# Patient Record
Sex: FEMALE | Race: BLACK OR AFRICAN AMERICAN | NOT HISPANIC OR LATINO | URBAN - METROPOLITAN AREA
[De-identification: names, ages, dates, MRNs, and addresses within clinical notes are randomized per-mention and may not be internally consistent; named-entity substitution may affect disease eponyms.]

---

## 2018-05-31 ENCOUNTER — NEW PATIENT (OUTPATIENT)
Dept: URBAN - METROPOLITAN AREA CLINIC 56 | Facility: CLINIC | Age: 63
End: 2018-05-31
Payer: OTHER GOVERNMENT

## 2018-05-31 DIAGNOSIS — H25.13 AGE-RELATED NUCLEAR CATARACT, BILATERAL: ICD-10-CM

## 2018-05-31 DIAGNOSIS — H16.223 KERATOCONJUNCTIVITIS SICCA, BILATERAL: ICD-10-CM

## 2018-05-31 DIAGNOSIS — Z79.84 LONG TERM (CURRENT) USE OF ORAL ANTIDIABETIC DRUGS: ICD-10-CM

## 2018-05-31 PROCEDURE — 99203 OFFICE O/P NEW LOW 30 MIN: CPT | Performed by: OPHTHALMOLOGY

## 2018-05-31 PROCEDURE — 92250 FUNDUS PHOTOGRAPHY W/I&R: CPT | Performed by: OPHTHALMOLOGY

## 2018-05-31 ASSESSMENT — INTRAOCULAR PRESSURE
OD: 18
OS: 18

## 2018-05-31 ASSESSMENT — VISUAL ACUITY
OS: 20/40
OD: 20/50

## 2018-05-31 ASSESSMENT — KERATOMETRY
OS: 42.99
OD: 42.91

## 2018-06-29 ENCOUNTER — Encounter (OUTPATIENT)
Dept: URBAN - METROPOLITAN AREA CLINIC 56 | Facility: CLINIC | Age: 63
End: 2018-06-29
Payer: OTHER GOVERNMENT

## 2018-06-29 DIAGNOSIS — Z01.818 ENCOUNTER FOR OTHER PREPROCEDURAL EXAMINATION: Primary | ICD-10-CM

## 2018-06-29 PROCEDURE — 92025 CPTRIZED CORNEAL TOPOGRAPHY: CPT | Performed by: OPHTHALMOLOGY

## 2018-06-29 PROCEDURE — 99213 OFFICE O/P EST LOW 20 MIN: CPT | Performed by: PHYSICIAN ASSISTANT

## 2018-07-11 ENCOUNTER — FOLLOW UP ESTABLISHED (OUTPATIENT)
Dept: URBAN - METROPOLITAN AREA CLINIC 44 | Facility: CLINIC | Age: 63
End: 2018-07-11
Payer: OTHER GOVERNMENT

## 2018-07-11 DIAGNOSIS — H25.11 AGE-RELATED NUCLEAR CATARACT, RIGHT EYE: Primary | ICD-10-CM

## 2018-07-11 PROCEDURE — 99213 OFFICE O/P EST LOW 20 MIN: CPT | Performed by: OPHTHALMOLOGY

## 2018-07-11 RX ORDER — PREDNISOLONE ACETATE 10 MG/ML
1 % SUSPENSION/ DROPS OPHTHALMIC
Qty: 1 | Refills: 2 | Status: INACTIVE
Start: 2018-07-11 | End: 2019-02-19

## 2018-07-11 RX ORDER — DICLOFENAC SODIUM 1 MG/ML
0.1 % SOLUTION/ DROPS OPHTHALMIC
Qty: 1 | Refills: 2 | Status: INACTIVE
Start: 2018-07-11 | End: 2019-02-19

## 2018-07-17 ENCOUNTER — SURGERY (OUTPATIENT)
Dept: URBAN - METROPOLITAN AREA SURGERY 19 | Facility: SURGERY | Age: 63
End: 2018-07-17
Payer: OTHER GOVERNMENT

## 2018-07-17 PROCEDURE — 66984 XCAPSL CTRC RMVL W/O ECP: CPT | Performed by: OPHTHALMOLOGY

## 2018-07-18 ENCOUNTER — POST OP (OUTPATIENT)
Dept: URBAN - METROPOLITAN AREA CLINIC 56 | Facility: CLINIC | Age: 63
End: 2018-07-18

## 2018-07-18 PROCEDURE — 99024 POSTOP FOLLOW-UP VISIT: CPT | Performed by: OPTOMETRIST

## 2018-07-18 ASSESSMENT — INTRAOCULAR PRESSURE: OD: 26

## 2018-07-20 ENCOUNTER — Encounter (OUTPATIENT)
Dept: URBAN - METROPOLITAN AREA CLINIC 56 | Facility: CLINIC | Age: 63
End: 2018-07-20
Payer: OTHER GOVERNMENT

## 2018-07-20 PROCEDURE — 99213 OFFICE O/P EST LOW 20 MIN: CPT | Performed by: PHYSICIAN ASSISTANT

## 2018-07-25 ENCOUNTER — POST OP (OUTPATIENT)
Dept: URBAN - METROPOLITAN AREA CLINIC 56 | Facility: CLINIC | Age: 63
End: 2018-07-25

## 2018-07-25 DIAGNOSIS — Z96.1 PRESENCE OF INTRAOCULAR LENS: Primary | ICD-10-CM

## 2018-07-25 PROCEDURE — 99024 POSTOP FOLLOW-UP VISIT: CPT | Performed by: OPTOMETRIST

## 2018-07-25 ASSESSMENT — INTRAOCULAR PRESSURE
OD: 25
OS: 23

## 2018-07-25 ASSESSMENT — VISUAL ACUITY
OD: 20/300
OS: 20/150

## 2018-07-26 ENCOUNTER — Encounter (OUTPATIENT)
Dept: URBAN - METROPOLITAN AREA CLINIC 51 | Facility: CLINIC | Age: 63
End: 2018-07-26
Payer: OTHER GOVERNMENT

## 2018-07-26 DIAGNOSIS — H35.371 PUCKERING OF MACULA, RIGHT EYE: Primary | ICD-10-CM

## 2018-07-26 PROCEDURE — 92014 COMPRE OPH EXAM EST PT 1/>: CPT | Performed by: OPHTHALMOLOGY

## 2018-07-26 PROCEDURE — 92134 CPTRZ OPH DX IMG PST SGM RTA: CPT | Performed by: OPHTHALMOLOGY

## 2018-07-26 ASSESSMENT — INTRAOCULAR PRESSURE
OD: 19
OS: 20

## 2018-08-01 ENCOUNTER — POST OP (OUTPATIENT)
Dept: URBAN - METROPOLITAN AREA CLINIC 44 | Facility: CLINIC | Age: 63
End: 2018-08-01

## 2018-08-01 PROCEDURE — 99024 POSTOP FOLLOW-UP VISIT: CPT | Performed by: OPHTHALMOLOGY

## 2018-08-01 ASSESSMENT — INTRAOCULAR PRESSURE
OS: 14
OD: 18

## 2018-08-07 ENCOUNTER — POST OP (OUTPATIENT)
Dept: URBAN - METROPOLITAN AREA CLINIC 44 | Facility: CLINIC | Age: 63
End: 2018-08-07

## 2018-08-07 PROCEDURE — 99024 POSTOP FOLLOW-UP VISIT: CPT | Performed by: OPHTHALMOLOGY

## 2018-08-07 ASSESSMENT — INTRAOCULAR PRESSURE
OD: 18
OS: 18

## 2019-02-19 ENCOUNTER — FOLLOW UP ESTABLISHED (OUTPATIENT)
Dept: URBAN - METROPOLITAN AREA CLINIC 44 | Facility: CLINIC | Age: 64
End: 2019-02-19
Payer: OTHER GOVERNMENT

## 2019-02-19 DIAGNOSIS — R73.03 PREDIABETES: ICD-10-CM

## 2019-02-19 PROCEDURE — 92250 FUNDUS PHOTOGRAPHY W/I&R: CPT | Performed by: OPHTHALMOLOGY

## 2019-02-19 PROCEDURE — 99213 OFFICE O/P EST LOW 20 MIN: CPT | Performed by: OPHTHALMOLOGY

## 2019-02-19 PROCEDURE — 92015 DETERMINE REFRACTIVE STATE: CPT | Performed by: OPHTHALMOLOGY

## 2019-02-19 PROCEDURE — 92083 EXTENDED VISUAL FIELD XM: CPT | Performed by: OPHTHALMOLOGY

## 2019-02-19 RX ORDER — METFORMIN HYDROCHLORIDE 1000 MG/1
TABLET, FILM COATED ORAL
Qty: 0 | Refills: 0 | Status: INACTIVE
Start: 2019-02-19 | End: 2019-06-27

## 2019-02-19 ASSESSMENT — VISUAL ACUITY
OS: 20/25
OD: 20/40

## 2019-02-19 ASSESSMENT — KERATOMETRY
OS: 43.13
OD: 43.25

## 2019-02-19 ASSESSMENT — INTRAOCULAR PRESSURE
OD: 14
OS: 15

## 2019-06-18 ENCOUNTER — FOLLOW UP ESTABLISHED (OUTPATIENT)
Dept: URBAN - METROPOLITAN AREA CLINIC 51 | Facility: CLINIC | Age: 64
End: 2019-06-18
Payer: OTHER GOVERNMENT

## 2019-06-18 DIAGNOSIS — E11.9 TYPE 2 DIABETES MELLITUS WITHOUT COMPLICATIONS: ICD-10-CM

## 2019-06-18 PROCEDURE — 92082 INTERMEDIATE VISUAL FIELD XM: CPT | Performed by: OPTOMETRIST

## 2019-06-18 PROCEDURE — 92014 COMPRE OPH EXAM EST PT 1/>: CPT | Performed by: OPTOMETRIST

## 2019-06-18 PROCEDURE — 92133 CPTRZD OPH DX IMG PST SGM ON: CPT | Performed by: OPTOMETRIST

## 2019-06-18 PROCEDURE — 92004 COMPRE OPH EXAM NEW PT 1/>: CPT | Performed by: OPTOMETRIST

## 2019-06-18 PROCEDURE — 92134 CPTRZ OPH DX IMG PST SGM RTA: CPT | Performed by: OPTOMETRIST

## 2019-06-18 ASSESSMENT — KERATOMETRY
OS: 43.24
OD: 43.08

## 2019-06-18 ASSESSMENT — VISUAL ACUITY
OD: 20/400
OS: 20/20

## 2019-06-18 ASSESSMENT — INTRAOCULAR PRESSURE
OS: 12
OD: 13

## 2019-06-27 ENCOUNTER — FOLLOW UP ESTABLISHED (OUTPATIENT)
Dept: URBAN - METROPOLITAN AREA CLINIC 51 | Facility: CLINIC | Age: 64
End: 2019-06-27
Payer: OTHER GOVERNMENT

## 2019-06-27 PROCEDURE — 92083 EXTENDED VISUAL FIELD XM: CPT | Performed by: OPTOMETRIST

## 2019-06-27 PROCEDURE — 92012 INTRM OPH EXAM EST PATIENT: CPT | Performed by: OPTOMETRIST

## 2020-02-11 ENCOUNTER — FOLLOW UP ESTABLISHED (OUTPATIENT)
Dept: URBAN - METROPOLITAN AREA CLINIC 51 | Facility: CLINIC | Age: 65
End: 2020-02-11
Payer: OTHER GOVERNMENT

## 2020-02-11 DIAGNOSIS — H25.12 AGE-RELATED NUCLEAR CATARACT, LEFT EYE: ICD-10-CM

## 2020-02-11 DIAGNOSIS — D35.2 BENIGN NEOPLASM OF PITUITARY GLAND: Primary | ICD-10-CM

## 2020-02-11 DIAGNOSIS — H25.89 OTHER AGE-RELATED CATARACT: ICD-10-CM

## 2020-02-11 PROCEDURE — 92012 INTRM OPH EXAM EST PATIENT: CPT | Performed by: OPTOMETRIST

## 2020-03-03 ENCOUNTER — NEW PATIENT (OUTPATIENT)
Dept: URBAN - METROPOLITAN AREA CLINIC 51 | Facility: CLINIC | Age: 65
End: 2020-03-03
Payer: MEDICARE

## 2020-03-03 PROCEDURE — 92083 EXTENDED VISUAL FIELD XM: CPT | Performed by: OPTOMETRIST

## 2020-03-03 PROCEDURE — 92015 DETERMINE REFRACTIVE STATE: CPT | Performed by: OPTOMETRIST

## 2020-03-03 PROCEDURE — 92014 COMPRE OPH EXAM EST PT 1/>: CPT | Performed by: OPTOMETRIST

## 2020-03-03 PROCEDURE — 92250 FUNDUS PHOTOGRAPHY W/I&R: CPT | Performed by: OPTOMETRIST

## 2020-03-03 ASSESSMENT — VISUAL ACUITY
OS: 20/20
OD: 20/40

## 2020-03-03 ASSESSMENT — INTRAOCULAR PRESSURE
OS: 12
OD: 10

## 2020-03-03 ASSESSMENT — KERATOMETRY
OS: 43.13
OD: 43.33

## 2020-04-27 ENCOUNTER — RX CHECK (OUTPATIENT)
Dept: URBAN - METROPOLITAN AREA CLINIC 51 | Facility: CLINIC | Age: 65
End: 2020-04-27
Payer: OTHER GOVERNMENT

## 2020-04-27 PROCEDURE — 92015 DETERMINE REFRACTIVE STATE: CPT | Performed by: OPTOMETRIST

## 2020-04-27 ASSESSMENT — KERATOMETRY
OD: 43.30
OS: 42.97

## 2020-04-27 ASSESSMENT — VISUAL ACUITY
OS: 20/25
OD: 20/40

## 2021-05-27 ENCOUNTER — OFFICE VISIT (OUTPATIENT)
Dept: URBAN - METROPOLITAN AREA CLINIC 51 | Facility: CLINIC | Age: 66
End: 2021-05-27
Payer: MEDICARE

## 2021-05-27 DIAGNOSIS — H52.4 PRESBYOPIA: ICD-10-CM

## 2021-05-27 PROCEDURE — 92134 CPTRZ OPH DX IMG PST SGM RTA: CPT | Performed by: OPTOMETRIST

## 2021-05-27 PROCEDURE — 92014 COMPRE OPH EXAM EST PT 1/>: CPT | Performed by: OPTOMETRIST

## 2021-05-27 ASSESSMENT — KERATOMETRY
OS: 43.02
OD: 42.78

## 2021-05-27 ASSESSMENT — INTRAOCULAR PRESSURE
OD: 11
OS: 12

## 2021-05-27 ASSESSMENT — VISUAL ACUITY
OD: 20/25
OS: 20/25

## 2021-05-27 NOTE — IMPRESSION/PLAN
Impression: Puckering of macula of right eye: H35.371. Plan: Ed pt on condition and no effect on [address] currently. RTC with any changes in vision.

## 2021-05-27 NOTE — IMPRESSION/PLAN
Impression: Tear film insufficiency of bilateral lacrimal glands: H04.123. Plan: Start ATs bid-tid OU. Gave list of recommend drops vs Similasan gtts. Recheck Rx next visit.

## 2021-05-27 NOTE — IMPRESSION/PLAN
Impression: Age-related nuclear cataract, left eye Plan: Ed pt on condition. Cataracts not visually significant or effecting ADLs/vision. Will continue to monitor, RTC with changes in vision sooner.

## 2021-05-27 NOTE — IMPRESSION/PLAN
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

## 2021-05-27 NOTE — IMPRESSION/PLAN
Impression: Presbyopia Plan: Will recheck next visit; pt notices no difference with Rx dilated. no drops next visit.

## 2021-06-17 ENCOUNTER — OFFICE VISIT (OUTPATIENT)
Dept: URBAN - METROPOLITAN AREA CLINIC 51 | Facility: CLINIC | Age: 66
End: 2021-06-17
Payer: MEDICARE

## 2021-06-17 DIAGNOSIS — H04.123 TEAR FILM INSUFFICIENCY OF BILATERAL LACRIMAL GLANDS: Primary | ICD-10-CM

## 2021-06-17 PROCEDURE — 92083 EXTENDED VISUAL FIELD XM: CPT | Performed by: OPTOMETRIST

## 2021-06-17 PROCEDURE — 99213 OFFICE O/P EST LOW 20 MIN: CPT | Performed by: OPTOMETRIST

## 2021-06-17 ASSESSMENT — KERATOMETRY
OD: 43.22
OS: 43.16

## 2021-06-17 ASSESSMENT — VISUAL ACUITY
OD: 20/25
OS: 20/25

## 2021-06-17 NOTE — IMPRESSION/PLAN
Impression: Tear film insufficiency of bilateral lacrimal glands: H04.123. Plan: Recommend continue AT's bid-tid OU. Informed patient that dry eye therapy is long term and to expect to have to use AT's indefinitely.

## 2021-06-17 NOTE — IMPRESSION/PLAN
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 Is improved from prior to treatment. HVF OD: temporal/central depression, OS: temporal sup>inf depression. 1 year repeat HVF 30-2.

## 2022-06-21 ENCOUNTER — OFFICE VISIT (OUTPATIENT)
Dept: URBAN - METROPOLITAN AREA CLINIC 51 | Facility: CLINIC | Age: 67
End: 2022-06-21
Payer: MEDICARE

## 2022-06-21 DIAGNOSIS — H25.12 AGE-RELATED NUCLEAR CATARACT, LEFT EYE: ICD-10-CM

## 2022-06-21 DIAGNOSIS — E11.9 TYPE 2 DIABETES MELLITUS WITHOUT COMPLICATIONS: Primary | ICD-10-CM

## 2022-06-21 DIAGNOSIS — D35.2 BENIGN NEOPLASM OF PITUITARY GLAND: ICD-10-CM

## 2022-06-21 DIAGNOSIS — H26.491 OTHER SECONDARY CATARACT, RIGHT EYE: ICD-10-CM

## 2022-06-21 DIAGNOSIS — H18.413 ARCUS SENILIS, BILATERAL: ICD-10-CM

## 2022-06-21 DIAGNOSIS — H35.371 PUCKERING OF MACULA OF RIGHT EYE: ICD-10-CM

## 2022-06-21 DIAGNOSIS — Z79.84 LONG TERM (CURRENT) USE OF ORAL ANTIDIABETIC DRUGS: ICD-10-CM

## 2022-06-21 DIAGNOSIS — H52.4 PRESBYOPIA: ICD-10-CM

## 2022-06-21 DIAGNOSIS — H43.313 VITREOUS MEMBRANES AND STRANDS, BILATERAL: ICD-10-CM

## 2022-06-21 DIAGNOSIS — H04.123 TEAR FILM INSUFFICIENCY OF BILATERAL LACRIMAL GLANDS: ICD-10-CM

## 2022-06-21 PROCEDURE — 92083 EXTENDED VISUAL FIELD XM: CPT | Performed by: OPTOMETRIST

## 2022-06-21 PROCEDURE — 92014 COMPRE OPH EXAM EST PT 1/>: CPT | Performed by: OPTOMETRIST

## 2022-06-21 PROCEDURE — 92134 CPTRZ OPH DX IMG PST SGM RTA: CPT | Performed by: OPTOMETRIST

## 2022-06-21 PROCEDURE — 92250 FUNDUS PHOTOGRAPHY W/I&R: CPT | Performed by: OPTOMETRIST

## 2022-06-21 ASSESSMENT — KERATOMETRY
OS: 43.13
OD: 43.30

## 2022-06-21 ASSESSMENT — VISUAL ACUITY
OD: 20/30
OS: 20/25

## 2022-06-21 ASSESSMENT — INTRAOCULAR PRESSURE
OS: 14
OD: 14

## 2022-06-21 NOTE — IMPRESSION/PLAN
Impression: Type 2 diabetes mellitus without complications: J81.2. Plan: No diabetic retinopathy observed on today's examination. Discussed the importance of annual diabetic eye exams. Discussed with patient importance of good blood sugar control. Recommend continue f/u care for DM control with PCP. Send report to PCP. Monitor annually with MAC OCT, FUNDUS PHOTOS.

## 2022-06-21 NOTE — IMPRESSION/PLAN
Impression: Age-related nuclear cataract, left eye Plan: Cataracts are visually significant/ effecting ADLs and vision. Educated patient that changing glasses will not completely improve vision. Discussed risk / benefit of surgery with patient. Patient defers surgery at this time and wishes to update specs. Will plan to monitor annually, sooner if patient notices changes/ worsening in vision.

## 2022-06-21 NOTE — IMPRESSION/PLAN
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 HVF remains stable. Will repeat 30-2 HVF annually. HVF OD: temporal/central depression, OS: temporal sup>inf depression. 1 year repeat HVF 30-2.

## 2022-06-21 NOTE — IMPRESSION/PLAN
Impression: Puckering of macula of right eye: H35.371. Plan: MAC OCT taken today. Not having any effect on patients vision. Patient will monitor vision closely and contact our office with any changes/worsening/distortion of vision. Monitor annually with MAC OCT.

## 2022-06-21 NOTE — IMPRESSION/PLAN
Impression: Presbyopia: H52.4. Plan: Discussed minimal changes in SRx, patient prefers. Release new SRx per patient request. Understands BCVA OS will continue to be limited 2' cataracts.

## 2022-06-21 NOTE — IMPRESSION/PLAN
Impression: Other secondary cataract, right eye: H26.491. Plan: Opacified capsule is not affecting vision. No indication for treatment. Return if decreased vision. Monitor.

## 2023-06-22 ENCOUNTER — OFFICE VISIT (OUTPATIENT)
Dept: URBAN - METROPOLITAN AREA CLINIC 51 | Facility: CLINIC | Age: 68
End: 2023-06-22
Payer: MEDICARE

## 2023-06-22 DIAGNOSIS — D35.2 BENIGN NEOPLASM OF PITUITARY GLAND: ICD-10-CM

## 2023-06-22 DIAGNOSIS — H35.371 PUCKERING OF MACULA OF RIGHT EYE: ICD-10-CM

## 2023-06-22 DIAGNOSIS — Z79.84 LONG TERM (CURRENT) USE OF ORAL ANTIDIABETIC DRUGS: ICD-10-CM

## 2023-06-22 DIAGNOSIS — H26.491 OTHER SECONDARY CATARACT, RIGHT EYE: ICD-10-CM

## 2023-06-22 DIAGNOSIS — H25.12 AGE-RELATED NUCLEAR CATARACT, LEFT EYE: Primary | ICD-10-CM

## 2023-06-22 DIAGNOSIS — H43.313 VITREOUS MEMBRANES AND STRANDS, BILATERAL: ICD-10-CM

## 2023-06-22 DIAGNOSIS — E11.9 TYPE 2 DIABETES MELLITUS WITHOUT COMPLICATIONS: ICD-10-CM

## 2023-06-22 PROCEDURE — 92014 COMPRE OPH EXAM EST PT 1/>: CPT | Performed by: OPTOMETRIST

## 2023-06-22 PROCEDURE — 92250 FUNDUS PHOTOGRAPHY W/I&R: CPT | Performed by: OPTOMETRIST

## 2023-06-22 PROCEDURE — 92083 EXTENDED VISUAL FIELD XM: CPT | Performed by: OPTOMETRIST

## 2023-06-22 PROCEDURE — 92134 CPTRZ OPH DX IMG PST SGM RTA: CPT | Performed by: OPTOMETRIST

## 2023-06-22 ASSESSMENT — INTRAOCULAR PRESSURE
OS: 13
OD: 12

## 2023-06-22 ASSESSMENT — KERATOMETRY
OD: 43.25
OS: 43.50

## 2023-06-22 ASSESSMENT — VISUAL ACUITY
OS: 20/25
OD: 20/25

## 2023-06-22 NOTE — IMPRESSION/PLAN
Impression: Puckering of macula of right eye: H35.371. Plan: ERM formation OD. MAC OCT taken today. Patient will monitor vision closely and contact our office with any changes/worsening/distortion of vision. Monitor annually with MAC OCT.

## 2023-06-22 NOTE — IMPRESSION/PLAN
Impression: Type 2 diabetes mellitus without complications: S38.9. Plan: No diabetic retinopathy observed on today's examination. Discussed the importance of annual diabetic eye exams. Discussed with patient importance of good blood sugar control. Recommend continue f/u care for DM control with PCP. Send report to PCP. Monitor annually with MAC OCT + FUNDUS PHOTOS.

## 2023-06-22 NOTE — IMPRESSION/PLAN
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 Will repeat 30-2 HVF annually. 

Monitor annually with 30-2 HVF

## 2023-06-22 NOTE — IMPRESSION/PLAN
Impression: Age-related nuclear cataract, left eye Plan: Cataracts are not bothersome enough to patient to warrant surgical intervention at this time. Will plan to monitor annually, sooner if patient notices changes/ worsening in vision.

## 2023-06-22 NOTE — IMPRESSION/PLAN
Impression: Vitreous membranes and strands, bilateral: H43.313. Plan: Retina is attached 360 degrees with no signs of any holes, tears or detachments. Patient to RTC ASAP if increase in floaters, flashes, or curtain or veil taking away vision.

## 2023-06-22 NOTE — IMPRESSION/PLAN
Impression: Other secondary cataract, right eye: H26.491. Plan: Opacified capsule is mild and not affecting vision. No indication for treatment. Return if decreased vision. Monitor.